# Patient Record
Sex: MALE | Race: WHITE
[De-identification: names, ages, dates, MRNs, and addresses within clinical notes are randomized per-mention and may not be internally consistent; named-entity substitution may affect disease eponyms.]

---

## 2018-10-20 ENCOUNTER — HOSPITAL ENCOUNTER (EMERGENCY)
Dept: HOSPITAL 31 - C.ER | Age: 28
Discharge: HOME | End: 2018-10-20
Payer: COMMERCIAL

## 2018-10-20 VITALS
SYSTOLIC BLOOD PRESSURE: 127 MMHG | RESPIRATION RATE: 20 BRPM | OXYGEN SATURATION: 98 % | TEMPERATURE: 96.4 F | DIASTOLIC BLOOD PRESSURE: 95 MMHG | HEART RATE: 89 BPM

## 2018-10-20 DIAGNOSIS — J66.2: Primary | ICD-10-CM

## 2018-10-20 NOTE — C.PDOC
History Of Present Illness





29 yo male w/o significant PMHx come in for evaluation of " feeling sick" after 

smoke cannabinoid for first time. Pt  denies headache, dizziness, CP, SOB, 

dyspnea, palpitation, abd. pain, back pain, denies suicidal or homocidal 

ideation. Denies previous hx of drug use. Ambulate to Ed accompanied by friends.


Time Seen by Provider: 10/20/18 20:44


Chief Complaint (Nursing): Medical Clearance


History Per: Patient, Other





Past Medical History


Reviewed: Historical Data, Nursing Documentation, Vital Signs


Vital Signs: 





                                Last Vital Signs











Temp  96.4 F L  10/20/18 20:28


 


Pulse  89   10/20/18 20:28


 


Resp  20   10/20/18 20:28


 


BP  127/95 H  10/20/18 20:28


 


Pulse Ox  98   10/20/18 20:28














- Medical History


PMH: No Chronic Diseases


Family History: States: No Known Family Hx





- Social History


Hx Tobacco Use: No


Hx Alcohol Use: No


Hx Substance Use: No





- Immunization History


Hx Tetanus Toxoid Vaccination: No


Hx Influenza Vaccination: Yes


Hx Pneumococcal Vaccination: No





Review Of Systems


Except As Marked, All Systems Reviewed And Found Negative.


Constitutional: Negative for: Fever, Chills


ENT: Negative for: Mouth Swelling, Throat Pain, Throat Swelling


Cardiovascular: Negative for: Chest Pain, Palpitations, Orthopnea, Edema, Light 

Headedness


Respiratory: Negative for: Cough, Shortness of Breath


Gastrointestinal: Positive for: Vomiting.  Negative for: Nausea, Abdominal Pain


Musculoskeletal: Negative for: Neck Pain, Back Pain


Skin: Negative for: Rash


Neurological: Negative for: Weakness, Numbness, Altered Mental Status, Headache,

Dizziness





Physical Exam





- Physical Exam


Appears: Well, Non-toxic, No Acute Distress


Skin: Normal Color, Warm, Dry, No Rash


Head: Normacephalic


Eye(s): bilateral: PERRL


Ear(s): Bilateral: Normal


Nose: No Flaring, No Discharge


Oral Mucosa: Moist


Tongue: No Swelling


Lips: No Swelling


Throat: No Erythema, No Drooling


Neck: Trachea Midline, Supple


Cardiovascular: Rhythm Regular, No Murmur, No JVD


Respiratory: No Decreased Breath Sounds, No Accessory Muscle Use, No Stridor, No

Wheezing


Gastrointestinal/Abdominal: Soft, No Tenderness, No Distention, No Guarding


Extremity: Normal ROM, No Deformity, No Swelling


Neurological/Psych: Oriented x3, Normal Speech, Normal Motor, Normal Sensation, 

Normal Reflexes





ED Course And Treatment


O2 Sat by Pulse Oximetry: 98


Pulse Ox Interpretation: Normal


Progress Note: On re-eval, pt is afebrile, hemodynamicaly stable.  NOn-toxic. 

AAO#3, ambulatoyr in Ed with stable gait.  PulseOx 98% RA.  Head: AT/NC.  Neck: 

Supple, (-) JVD.  Lungs: CTA B/L, BS equal B/L.  ABd: benign, (-) guarding, (-) 

rebound.  Neurologicaly intact.  Pt advised. ref. to F/U with PMD in 2-3 days 

for re-eavl.  Stable for discharge now.





Disposition


Counseled Patient/Family Regarding: Diagnosis, Need For Followup





- Disposition


Referrals: 


[facility] [Outside]


Disposition: HOME/ ROUTINE


Disposition Time: 21:11


Condition: STABLE


Additional Instructions: 


Encourage fluids


Follow up with PMD in 2-3 days for re-evaluation.


return if any new changes.


Instructions:  Marijuana Use and Addiction


Forms:  [facility] (English)





- Clinical Impression


Clinical Impression: 


 Cannabinosis